# Patient Record
Sex: FEMALE | Race: WHITE | NOT HISPANIC OR LATINO | URBAN - METROPOLITAN AREA
[De-identification: names, ages, dates, MRNs, and addresses within clinical notes are randomized per-mention and may not be internally consistent; named-entity substitution may affect disease eponyms.]

---

## 2019-04-01 ENCOUNTER — COMMUNICATION - HEALTHEAST (OUTPATIENT)
Dept: SCHEDULING | Facility: CLINIC | Age: 39
End: 2019-04-01

## 2019-04-02 ENCOUNTER — COMMUNICATION - HEALTHEAST (OUTPATIENT)
Dept: TELEHEALTH | Facility: CLINIC | Age: 39
End: 2019-04-02

## 2019-04-02 ENCOUNTER — OFFICE VISIT - HEALTHEAST (OUTPATIENT)
Dept: FAMILY MEDICINE | Facility: CLINIC | Age: 39
End: 2019-04-02

## 2019-04-02 ENCOUNTER — COMMUNICATION - HEALTHEAST (OUTPATIENT)
Dept: FAMILY MEDICINE | Facility: CLINIC | Age: 39
End: 2019-04-02

## 2019-04-02 DIAGNOSIS — Z76.89 ESTABLISHING CARE WITH NEW DOCTOR, ENCOUNTER FOR: ICD-10-CM

## 2019-04-02 DIAGNOSIS — G44.82 HEADACHE ASSOCIATED WITH SEXUAL ACTIVITY: ICD-10-CM

## 2019-04-02 ASSESSMENT — MIFFLIN-ST. JEOR: SCORE: 1307.5

## 2019-04-04 ENCOUNTER — AMBULATORY - HEALTHEAST (OUTPATIENT)
Dept: ADMINISTRATIVE | Facility: CLINIC | Age: 39
End: 2019-04-04

## 2019-04-04 ENCOUNTER — COMMUNICATION - HEALTHEAST (OUTPATIENT)
Dept: FAMILY MEDICINE | Facility: CLINIC | Age: 39
End: 2019-04-04

## 2019-04-04 DIAGNOSIS — G44.82 HEADACHE ASSOCIATED WITH SEXUAL ACTIVITY: ICD-10-CM

## 2019-04-12 ENCOUNTER — OFFICE VISIT - HEALTHEAST (OUTPATIENT)
Dept: FAMILY MEDICINE | Facility: CLINIC | Age: 39
End: 2019-04-12

## 2019-04-12 DIAGNOSIS — G44.82 HEADACHE ASSOCIATED WITH SEXUAL ACTIVITY: ICD-10-CM

## 2019-04-15 ENCOUNTER — COMMUNICATION - HEALTHEAST (OUTPATIENT)
Dept: FAMILY MEDICINE | Facility: CLINIC | Age: 39
End: 2019-04-15

## 2019-04-24 ENCOUNTER — TELEPHONE (OUTPATIENT)
Facility: CLINIC | Age: 39
End: 2019-04-24

## 2019-04-24 ENCOUNTER — OFFICE VISIT (OUTPATIENT)
Dept: NEUROLOGY | Facility: CLINIC | Age: 39
End: 2019-04-24
Payer: COMMERCIAL

## 2019-04-24 ENCOUNTER — RECORDS - HEALTHEAST (OUTPATIENT)
Dept: ADMINISTRATIVE | Facility: OTHER | Age: 39
End: 2019-04-24

## 2019-04-24 VITALS
HEIGHT: 65 IN | BODY MASS INDEX: 24.44 KG/M2 | DIASTOLIC BLOOD PRESSURE: 92 MMHG | RESPIRATION RATE: 16 BRPM | SYSTOLIC BLOOD PRESSURE: 162 MMHG | HEART RATE: 71 BPM | TEMPERATURE: 98.7 F | WEIGHT: 146.7 LBS | OXYGEN SATURATION: 97 %

## 2019-04-24 DIAGNOSIS — Z78.9 VEGAN DIET: ICD-10-CM

## 2019-04-24 DIAGNOSIS — G44.84 EXERTIONAL HEADACHE: Primary | ICD-10-CM

## 2019-04-24 DIAGNOSIS — R90.89 ABNORMAL MRA, BRAIN: ICD-10-CM

## 2019-04-24 DIAGNOSIS — E55.9 VITAMIN D DEFICIENCY: ICD-10-CM

## 2019-04-24 DIAGNOSIS — M54.2 NECK PAIN: ICD-10-CM

## 2019-04-24 DIAGNOSIS — H53.8 BLURRING OF VISUAL IMAGE: ICD-10-CM

## 2019-04-24 DIAGNOSIS — G44.84 EXERTIONAL HEADACHE: ICD-10-CM

## 2019-04-24 LAB
ALBUMIN SERPL-MCNC: 3.9 G/DL (ref 3.4–5)
ALP SERPL-CCNC: 51 U/L (ref 40–150)
ALT SERPL W P-5'-P-CCNC: 23 U/L (ref 0–50)
ANION GAP SERPL CALCULATED.3IONS-SCNC: 7 MMOL/L (ref 3–14)
AST SERPL W P-5'-P-CCNC: 12 U/L (ref 0–45)
BASOPHILS # BLD AUTO: 0.1 10E9/L (ref 0–0.2)
BASOPHILS NFR BLD AUTO: 1.1 %
BILIRUB SERPL-MCNC: 0.3 MG/DL (ref 0.2–1.3)
BUN SERPL-MCNC: 12 MG/DL (ref 7–30)
CALCIUM SERPL-MCNC: 9.6 MG/DL (ref 8.5–10.1)
CHLORIDE SERPL-SCNC: 108 MMOL/L (ref 94–109)
CO2 SERPL-SCNC: 24 MMOL/L (ref 20–32)
CREAT SERPL-MCNC: 0.57 MG/DL (ref 0.52–1.04)
DIFFERENTIAL METHOD BLD: NORMAL
EOSINOPHIL # BLD AUTO: 0.7 10E9/L (ref 0–0.7)
EOSINOPHIL NFR BLD AUTO: 8.2 %
ERYTHROCYTE [DISTWIDTH] IN BLOOD BY AUTOMATED COUNT: 12.2 % (ref 10–15)
GFR SERPL CREATININE-BSD FRML MDRD: >90 ML/MIN/{1.73_M2}
GLUCOSE SERPL-MCNC: 95 MG/DL (ref 70–99)
HCT VFR BLD AUTO: 39.9 % (ref 35–47)
HGB BLD-MCNC: 13.4 G/DL (ref 11.7–15.7)
IMM GRANULOCYTES # BLD: 0 10E9/L (ref 0–0.4)
IMM GRANULOCYTES NFR BLD: 0.2 %
LYMPHOCYTES # BLD AUTO: 1.9 10E9/L (ref 0.8–5.3)
LYMPHOCYTES NFR BLD AUTO: 23.3 %
MCH RBC QN AUTO: 31.6 PG (ref 26.5–33)
MCHC RBC AUTO-ENTMCNC: 33.6 G/DL (ref 31.5–36.5)
MCV RBC AUTO: 94 FL (ref 78–100)
MONOCYTES # BLD AUTO: 0.6 10E9/L (ref 0–1.3)
MONOCYTES NFR BLD AUTO: 7.2 %
NEUTROPHILS # BLD AUTO: 5 10E9/L (ref 1.6–8.3)
NEUTROPHILS NFR BLD AUTO: 60 %
NRBC # BLD AUTO: 0 10*3/UL
NRBC BLD AUTO-RTO: 0 /100
PLATELET # BLD AUTO: 280 10E9/L (ref 150–450)
POTASSIUM SERPL-SCNC: 3.7 MMOL/L (ref 3.4–5.3)
PROT SERPL-MCNC: 7.8 G/DL (ref 6.8–8.8)
RBC # BLD AUTO: 4.24 10E12/L (ref 3.8–5.2)
SODIUM SERPL-SCNC: 139 MMOL/L (ref 133–144)
T4 FREE SERPL-MCNC: 0.96 NG/DL (ref 0.76–1.46)
TSH SERPL DL<=0.005 MIU/L-ACNC: 0.92 MU/L (ref 0.4–4)
VIT B12 SERPL-MCNC: 279 PG/ML (ref 193–986)
WBC # BLD AUTO: 8.3 10E9/L (ref 4–11)

## 2019-04-24 ASSESSMENT — MIFFLIN-ST. JEOR: SCORE: 1325.05

## 2019-04-24 ASSESSMENT — ENCOUNTER SYMPTOMS
NECK PAIN: 1
INSOMNIA: 1
PARALYSIS: 0
PANIC: 0
NECK MASS: 0
TASTE DISTURBANCE: 0
MEMORY LOSS: 0
SMELL DISTURBANCE: 0
SORE THROAT: 0
SINUS CONGESTION: 0
TREMORS: 0
HOARSE VOICE: 0
TINGLING: 0
BACK PAIN: 1
MUSCLE CRAMPS: 0
NUMBNESS: 0
HOT FLASHES: 0
STIFFNESS: 0
MYALGIAS: 1
JOINT SWELLING: 0
EYE WATERING: 0
WEAKNESS: 0
LOSS OF CONSCIOUSNESS: 0
SPEECH CHANGE: 0
ARTHRALGIAS: 0
EYE REDNESS: 0
DOUBLE VISION: 0
DIZZINESS: 0
MUSCLE WEAKNESS: 0
DEPRESSION: 1
DECREASED CONCENTRATION: 1
DECREASED LIBIDO: 0
NERVOUS/ANXIOUS: 1
HEADACHES: 1
SEIZURES: 0
DISTURBANCES IN COORDINATION: 0
EYE PAIN: 1
SINUS PAIN: 0
TROUBLE SWALLOWING: 0
EYE IRRITATION: 1

## 2019-04-24 ASSESSMENT — PAIN SCALES - GENERAL: PAINLEVEL: NO PAIN (0)

## 2019-04-24 NOTE — LETTER
"2019       RE: Diane Blackman  [address] [place] [address]     Dear Colleague,    Thank you for referring your patient, Diane Blackman, to the [facility] NEUROLOGY at [facility]. Please see a copy of my visit note below.    Service Date: 2019        RE: Diane Blackman   MRN: [id number]   : 1980      Dear Dr. Cam:      Thank you for referring Diane Blackman for neurologic consultation on 2018.  The patient is a 39-year-old woman you were kind enough to refer because of exertional headaches.      The patient said that sometime after the first of the year she developed a coital headache during climax.  She then had a severe onset of headache on .  This was right-sided, again at climax.  She said it involved the right parietal temporal area.  It was awful for 5 minutes and went into the ear and down the side of her right neck.  She said the pain lasted for at least an hour and then seemed to linger up to 4 hours and she was \"wiped out and had to rest.\"  She had 5 or 6 other episodes like this, but she noted that one occurred after she was brushing her tongue and started to gag and cough.  That headache lasted probably 45 minutes.  She had other ones that happened in April.  She said that some of these had occurred when she was doing aerobic type of exercises.  She would lift up to 6-pound dumbbells in either arm.  She can recall though having 1 episode that occurred lifting a satchel weighing 15-20 pounds.  She started to have another one during intercourse, but she said that she did not go through with the complete act and it got better.  The patient did recall that the night before the episode on , she probably had drank 5 bottles of beer over the course of a number of hours and had pizza.  She did not think there was anything unusual and she usually does not drink this much.  She did recall having normal " "sleep and she had intercourse with her boyfriend upon awakening in the morning.  She had not had coffee yet and she typically drinks a pot and a half of coffee per day.  The patient said she was nauseated with the most severe headache but did not vomit.  She has never had previous headaches with coughing, sneezing, straining or lifting including bowel movements.  She has lifted her daughter who weighs 80 pounds and also garbage.  She otherwise does not usually do heavy lifting other than the satchel she carries.  She never had stiffness for forward flexion of her neck.  She has not had headaches in the past.  She did review with me dietary issues and she has been a vegetarian since age 15.  She also does drink beside coffee Dr. Pepper or Diet Pepsi daily.  She does not need any meat, but does eat eggs and cheese.  The patient did take a multivitamin when she was pregnant with her daughter.  She did notice this since these headaches started that it is hard for her to see clearly.  She has to squint.  She said that handwriting looks smaller.  She does also note her right eye hurts after reading.  She has been using \"cheater lenses\".  She denied any weakness, paresthesias, imbalance, visual auras, speech arrest or other problems.      I was able to review your records.  Specifically, I reviewed these with her from 2019.  The patient did also tell me that she has continued to smoke, although she has been admonished not to and knows it is injurious to her health.  She has not used any illicit drugs including marijuana.  She has no history of allergies.  The patient has had a , described herself as being in good health.  She has had trouble though with fatigue.  She is .  She has a significant boyfriend that she sees.  The patient reviewed her family history and of her 3 sisters 2 have had chronic headaches and 1 has had issues that are female in nature.  Her child is alive and well.  Her father " suffered a brain hemorrhage at age 60 and started having a series of strokes after 40.  He had occasionally used alcohol and he had had chronic hypertension and passive smoke inhalation.  Her mother has been a chronic smoker and has chronic obstructive lung disease and is 59.      The patient did have MRA study done through [facility].  I could only get the report and not actual films.  She will get a CD ROM for us.  It showed a 1 mm right lateral projected outpouching off of the A2 segment suggesting a tiny laterally projecting aneurysm versus small vascular infundibulum.  It was recommended that she have thin section head CTA for further evaluation.  Her head MRV was normal.  This study was done on 04/12/2019.  The study result was reviewed with Dr. Todd and he did recommend that she go ahead and have formal CT angiography of the neck and intracranial vessels and have consult with him in the Neurology/Neurosurgery [facility]neurysm [facility].  She also on 04/04/2019 had a normal head MRI through [facility].      I reviewed your record too from 04/12/2019 with her.      Neurologic examination revealed a pleasant woman.  Her blood pressure was initially 162/92 done by our medical assistant with a blood pressure machine.  Her pulse was 71.  I retook it with a soft cuff and it was 140/78 with a pulse of 72.  Otherwise, gait, station, cerebellar testing, muscle stretch reflexes which were all normal except for mildly reduced ankle jerks, which were trace-present symmetrically, plantar stimulation, strength, cranial nerve examination, superficial and cortical sensory testing are unremarkable.  She had no cerebral or cervical bruits.  The patient's cardiac rhythm was regular without gallops or murmurs.  She had normal eyegrounds.  The patient had no signs of retinal changes or papilledema.  She did not have a Mellissa syndrome.      IMPRESSION:   1.  Exertional headaches.   2.  Abnormal intracranial MRA study.   3.   Chronic smoking history and excessive caffeine use.     4.  Fatigue.     5.  Vegetarian.   6.  Unusual visual disturbance involving the right eye since the onset of her headaches.      The patient is going to go ahead now and have a CT angiogram and followup with Dr. Todd.  She will be seeing me and also to bring her CD ROMs from [facility] to review.  I have recommended a number of blood tests to be done because of vegetarian status as well as her fatigue.  I did suggest that she followup carefully with you concerning her blood pressure elevation.  She is going to have intercourse again, but attempt taking 400-600 mg of ibuprofen 30 minutes ahead of the time of intercourse to see if this helps.  I talked with her about other medicines that could be tried and did go over the diagnosis of coital headaches with her through the Internet. I also suggested eye examination.  She will be seen back shortly.        Thank you for allowing me to see this patient.         I spent 1 hour with the patient.  Over 50% of the time this involved counseling and coordination of care. A complete review of medical systems was done and a positive review in the above report.        D: 2019   T: 2019   MT: AKA      Name:     NEAL VILLALBA   MRN:      [id number]        Account:      [id number]   :      1980           Service Date: 2019      Document: [id number]        Again, thank you for allowing me to participate in the care of your patient.      Sincerely,    Dionte García MD    CC:  [place] [address]

## 2019-04-24 NOTE — TELEPHONE ENCOUNTER
[facility] Call Center    Phone Message    May a detailed message be left on voicemail: yes    Reason for Call: Other: pt calling to see if Ct is going to include having her kidneies checked as well dacia that there was converstation about it. Please call pt back to discuss.     Action Taken: Message routed to:  [facility] ([facility] neurology

## 2019-04-24 NOTE — NURSING NOTE
Chief Complaint   Patient presents with     New Patient     [facility] NEW - POSSIBLE BRAIN ANEURYSM / SENSATION , RIGHT SIDE OF THE HEAD     Results     Rufina Carrasco

## 2019-04-25 LAB — DEPRECATED CALCIDIOL+CALCIFEROL SERPL-MC: 9 UG/L (ref 20–75)

## 2019-04-25 NOTE — TELEPHONE ENCOUNTER
Spoke with pt and let her know that all of her labs were normal. Specifically, kidney function was normal so she is able to proceed with CT.

## 2019-04-26 ENCOUNTER — TELEPHONE (OUTPATIENT)
Dept: NEUROLOGY | Facility: CLINIC | Age: 39
End: 2019-04-26

## 2019-04-26 DIAGNOSIS — E55.9 VITAMIN D DEFICIENCY: Primary | ICD-10-CM

## 2019-04-26 DIAGNOSIS — E55.9 VITAMIN D DEFICIENCY: ICD-10-CM

## 2019-04-26 LAB — METHYLMALONATE SERPL-SCNC: 0.18 UMOL/L (ref 0–0.4)

## 2019-04-26 RX ORDER — ERGOCALCIFEROL 1.25 MG/1
50000 CAPSULE, LIQUID FILLED ORAL WEEKLY
Qty: 8 CAPSULE | Refills: 0 | Status: SHIPPED | OUTPATIENT
Start: 2019-04-26 | End: 2019-06-20

## 2019-04-26 RX ORDER — ERGOCALCIFEROL 1.25 MG/1
50000 CAPSULE, LIQUID FILLED ORAL WEEKLY
Qty: 8 CAPSULE | Refills: 0 | Status: SHIPPED | OUTPATIENT
Start: 2019-04-26 | End: 2019-04-26

## 2019-04-26 NOTE — TELEPHONE ENCOUNTER
Spoke with pt to inform her of lab results and recommendations per Dr. García.       Message   Received: Today   Message Contents   Dionte García MD Armbruster, Kathleen M RN             Vitamin d only 9[quite low]Will start prescription[ordered]; also b12 under 300, start otc 2000 mcg daily B12; I'll recheck levels when I see her

## 2019-04-29 ENCOUNTER — ANCILLARY PROCEDURE (OUTPATIENT)
Dept: CT IMAGING | Facility: CLINIC | Age: 39
End: 2019-04-29
Attending: PSYCHIATRY & NEUROLOGY
Payer: COMMERCIAL

## 2019-04-29 DIAGNOSIS — M54.2 NECK PAIN: ICD-10-CM

## 2019-04-29 DIAGNOSIS — G44.84 EXERTIONAL HEADACHE: ICD-10-CM

## 2019-04-29 DIAGNOSIS — R90.89 ABNORMAL MRA, BRAIN: ICD-10-CM

## 2019-04-29 RX ORDER — IOPAMIDOL 755 MG/ML
75 INJECTION, SOLUTION INTRAVASCULAR ONCE
Status: COMPLETED | OUTPATIENT
Start: 2019-04-29 | End: 2019-04-29

## 2019-04-29 RX ADMIN — IOPAMIDOL 75 ML: 755 INJECTION, SOLUTION INTRAVASCULAR at 15:29

## 2019-04-29 NOTE — DISCHARGE INSTRUCTIONS

## 2019-04-30 NOTE — PROGRESS NOTES
"Service Date: 2019      Shira Cam MD   [address] [address]      RE: Diane Blackman   MRN: [id number]   : 1980      Dear Dr. Cam:      Thank you for referring Diane Blackman for neurologic consultation on 2018.  The patient is a 39-year-old woman you were kind enough to refer because of exertional headaches.      The patient said that sometime after the first of the year she developed a coital headache during climax.  She then had a severe onset of headache on .  This was right-sided, again at climax.  She said it involved the right parietal temporal area.  It was awful for 5 minutes and went into the ear and down the side of her right neck.  She said the pain lasted for at least an hour and then seemed to linger up to 4 hours and she was \"wiped out and had to rest.\"  She had 5 or 6 other episodes like this, but she noted that one occurred after she was brushing her tongue and started to gag and cough.  That headache lasted probably 45 minutes.  She had other ones that happened in April.  She said that some of these had occurred when she was doing aerobic type of exercises.  She would lift up to 6-pound dumbbells in either arm.  She can recall though having 1 episode that occurred lifting a satchel weighing 15-20 pounds.  She started to have another one during intercourse, but she said that she did not go through with the complete act and it got better.  The patient did recall that the night before the episode on , she probably had drank 5 bottles of beer over the course of a number of hours and had pizza.  She did not think there was anything unusual and she usually does not drink this much.  She did recall having normal sleep and she had intercourse with her boyfriend upon awakening in the morning.  She had not had coffee yet and she typically drinks a pot and a half of coffee per day.  The patient said she was nauseated " "with the most severe headache but did not vomit.  She has never had previous headaches with coughing, sneezing, straining or lifting including bowel movements.  She has lifted her daughter who weighs 80 pounds and also garbage.  She otherwise does not usually do heavy lifting other than the satchel she carries.  She never had stiffness for forward flexion of her neck.  She has not had headaches in the past.  She did review with me dietary issues and she has been a vegetarian since age 15.  She also does drink beside coffee Dr. Pepper or Diet Pepsi daily.  She does not need any meat, but does eat eggs and cheese.  The patient did take a multivitamin when she was pregnant with her daughter.  She did notice this since these headaches started that it is hard for her to see clearly.  She has to squint.  She said that handwriting looks smaller.  She does also note her right eye hurts after reading.  She has been using \"cheater lenses\".  She denied any weakness, paresthesias, imbalance, visual auras, speech arrest or other problems.      I was able to review your records.  Specifically, I reviewed these with her from 2019.  The patient did also tell me that she has continued to smoke, although she has been admonished not to and knows it is injurious to her health.  She has not used any illicit drugs including marijuana.  She has no history of allergies.  The patient has had a , described herself as being in good health.  She has had trouble though with fatigue.  She is .  She has a significant boyfriend that she sees.  The patient reviewed her family history and of her 3 sisters 2 have had chronic headaches and 1 has had issues that are female in nature.  Her child is alive and well.  Her father suffered a brain hemorrhage at age 60 and started having a series of strokes after 40.  He had occasionally used alcohol and he had had chronic hypertension and passive smoke inhalation.  Her mother has been " a chronic smoker and has chronic obstructive lung disease and is 59.      The patient did have MRA study done through [facility].  I could only get the report and not actual films.  She will get a CD ROM for us.  It showed a 1 mm right lateral projected outpouching off of the A2 segment suggesting a tiny laterally projecting aneurysm versus small vascular infundibulum.  It was recommended that she have thin section head CTA for further evaluation.  Her head MRV was normal.  This study was done on 04/12/2019.  The study result was reviewed with Dr. Todd and he did recommend that she go ahead and have formal CT angiography of the neck and intracranial vessels and have consult with him in the Neurology/Neurosurgery Aneurysm Clinic.  She also on 04/04/2019 had a normal head MRI through [facility].      I reviewed your record too from 04/12/2019 with her.      Neurologic examination revealed a pleasant woman.  Her blood pressure was initially 162/92 done by our medical assistant with a blood pressure machine.  Her pulse was 71.  I retook it with a soft cuff and it was 140/78 with a pulse of 72.  Otherwise, gait, station, cerebellar testing, muscle stretch reflexes which were all normal except for mildly reduced ankle jerks, which were trace-present symmetrically, plantar stimulation, strength, cranial nerve examination, superficial and cortical sensory testing are unremarkable.  She had no cerebral or cervical bruits.  The patient's cardiac rhythm was regular without gallops or murmurs.  She had normal eyegrounds.  The patient had no signs of retinal changes or papilledema.  She did not have a Mellissa syndrome.      IMPRESSION:   1.  Exertional headaches.   2.  Abnormal intracranial MRA study.   3.  Chronic smoking history and excessive caffeine use.     4.  Fatigue.     5.  Vegetarian.   6.  Unusual visual disturbance involving the right eye since the onset of her headaches.      The patient is going  to go ahead now and have a CT angiogram and followup with Dr. Todd.  She will be seeing me and also to bring her CD ROMs from [facility] to review.  I have recommended a number of blood tests to be done because of vegetarian status as well as her fatigue.  I did suggest that she followup carefully with you concerning her blood pressure elevation.  She is going to have intercourse again, but attempt taking 400-600 mg of ibuprofen 30 minutes ahead of the time of intercourse to see if this helps.  I talked with her about other medicines that could be tried and did go over the diagnosis of coital headaches with her through the Internet. I also suggested eye examination.  She will be seen back shortly.        Thank you for allowing me to see this patient.       Sincerely yours,       Mara García MD      I spent 1 hour with the patient.  Over 50% of the time this involved counseling and coordination of care. A complete review of medical systems was done and a positive review in the above report.        MARA GARCÍA MD             D: 2019   T: 2019   MT: AKA      Name:     NEAL VILLALBA   MRN:      [id number]        Account:      [id number]   :      1980           Service Date: 2019      Document: [id number]

## 2019-05-01 ENCOUNTER — CARE COORDINATION (OUTPATIENT)
Dept: NEUROLOGY | Facility: CLINIC | Age: 39
End: 2019-05-01

## 2019-05-01 NOTE — PROGRESS NOTES
Dionte García MD Seeb, Tracey RN             Her cta did not show aneurysm[good news]; still follow up with me and also with neurosurgery[they need to carefully go over this test and prior study.      Called and left a voice message asking for a call back so I can inform her of the information above from Dr. García.

## 2019-05-09 ENCOUNTER — OFFICE VISIT (OUTPATIENT)
Dept: NEUROLOGY | Facility: CLINIC | Age: 39
End: 2019-05-09
Payer: COMMERCIAL

## 2019-05-09 ENCOUNTER — RECORDS - HEALTHEAST (OUTPATIENT)
Dept: ADMINISTRATIVE | Facility: OTHER | Age: 39
End: 2019-05-09

## 2019-05-09 VITALS
WEIGHT: 150 LBS | HEIGHT: 65 IN | SYSTOLIC BLOOD PRESSURE: 139 MMHG | DIASTOLIC BLOOD PRESSURE: 79 MMHG | OXYGEN SATURATION: 96 % | BODY MASS INDEX: 24.99 KG/M2 | TEMPERATURE: 98 F | HEART RATE: 69 BPM

## 2019-05-09 DIAGNOSIS — E53.8 VITAMIN B12 DEFICIENCY (NON ANEMIC): Primary | ICD-10-CM

## 2019-05-09 DIAGNOSIS — E55.9 VITAMIN D DEFICIENCY: ICD-10-CM

## 2019-05-09 ASSESSMENT — ENCOUNTER SYMPTOMS
SPEECH CHANGE: 0
EYE IRRITATION: 0
WEAKNESS: 0
DOUBLE VISION: 0
PARALYSIS: 0
NERVOUS/ANXIOUS: 1
TREMORS: 0
HEADACHES: 1
NUMBNESS: 0
INSOMNIA: 1
PANIC: 0
SINUS CONGESTION: 0
NECK MASS: 0
SORE THROAT: 0
TASTE DISTURBANCE: 0
TINGLING: 0
DISTURBANCES IN COORDINATION: 0
DEPRESSION: 1
EYE WATERING: 0
SMELL DISTURBANCE: 0
TROUBLE SWALLOWING: 0
EYE PAIN: 1
DIZZINESS: 0
HOARSE VOICE: 0
SEIZURES: 0
LOSS OF CONSCIOUSNESS: 0
MEMORY LOSS: 0
DECREASED CONCENTRATION: 1
SINUS PAIN: 0
EYE REDNESS: 0

## 2019-05-09 ASSESSMENT — MIFFLIN-ST. JEOR: SCORE: 1340.08

## 2019-05-09 ASSESSMENT — PAIN SCALES - GENERAL: PAINLEVEL: NO PAIN (0)

## 2019-05-09 NOTE — LETTER
RE: Diane Blackman  [address] [place] [address]     Service Date: 2019      Shira Cam MD   [address] [address]      RE: Diane Blackman   MRN: [id number]   : 1980      Dear Dr. Cam:      This is in regard to followup on Diane Blackman.  The patient returned today with chief complaint of exertional headache as well as possible abnormal MRA of the intracranial circulation.      The patient said that she has had intercourse once since I last saw her.  She did not try ibuprofen 30-60 minutes before.  She said fortunately, though, she did not have coital headache.  She has markedly reduced her use of caffeinated coffee and also Pepsi or Coke.  She denied any current headache or neck pain.  She reviewed my history and said it was correct and I made sure she had a copy.  The patient is going to request from you referral to our neuro-ophthalmologist because she still has trouble with her right eye.  She has had some discomfort there and also some need to squint as well as decreased vision since her headache came on.         The patient did end up having a CT angiogram done here on 2019.  This was compared to the previous outside study from 2019.  This was read by Dr. Murphy.  He felt that there was no definite aneurysm or stenosis of the major intracranial arteries.  There was no evidence of intracranial hemorrhage on the noncontrast head CT.  She had some questionable atherosclerotic irregularity of the distal right middle cerebral artery vasculature.  Her neck CTA demonstrated no significant stenosis of the major cervical arteries.  The patient was to see Dr. Todd, our neurosurgical vascular expert.  He did go over all of these films with me when the patient was here and felt that there were no findings to suggest an aneurysm.  I reassured the patient about Dr. Murphy and Dr. Todd's interpretation of  her studies.  I strongly urged the patient to stop smoking again.  She understands how injurious this could be to her health.      The patient on 2019 had a normal metabolic profile.  Her vitamin B12 level was low at 279 picograms.  Her methylmalonic acid level was normal at 0.18.  She had normal TSH and free T4.  Her vitamin D level was low at 9.  Her glucose was 95.  She had normal complete blood count, including MCV of 94.  The patient had been told by my nurse to start oral B12 replacement.  I also suggested to her today that she take a multivitamin.  I suspect that the B12 level of 279 relates to her vegetarian state.  Her vitamin D level could relate to it too and she is now on 50,000 units weekly for 8 weeks.  I have talked with her about having followup vitamin D3, B12 levels done as well as parietal cell antibody.  These will be done in approximately 2 months.  She is going to have followup with me one more time to assess these levels, but also whether her headaches have recurred.  Hopefully, my suggestions will be of benefit her.  I went over the importance of taking B12 to achieve a B12 level greater than 400 picograms.  I went over deficiency states related to B12 issues and the neurologic sequelae.  She is extremely intelligent and I believe she understands all this.  I also went over the importance of vitamin D supplementation to achieve a higher level that is therapeutic.       Thank you for allowing me to see this patient.       Sincerely yours,      Dionte García MD      I spent 30 minutes with the patient today.  Over 50% of the time this involved counseling and coordination of care.       D: 2019   T: 05/10/2019   MT: AKA    Name:     NEAL VILLALBA   MRN:      [id number]        Account:      [id number]   :      1980           Service Date: 2019    Document: [id number]

## 2019-05-10 ENCOUNTER — OFFICE VISIT - HEALTHEAST (OUTPATIENT)
Dept: FAMILY MEDICINE | Facility: CLINIC | Age: 39
End: 2019-05-10

## 2019-05-10 ENCOUNTER — COMMUNICATION - HEALTHEAST (OUTPATIENT)
Dept: FAMILY MEDICINE | Facility: CLINIC | Age: 39
End: 2019-05-10

## 2019-05-10 DIAGNOSIS — J02.9 SORE THROAT: ICD-10-CM

## 2019-05-10 DIAGNOSIS — H53.9 VISION CHANGES: ICD-10-CM

## 2019-05-10 DIAGNOSIS — G44.82 HEADACHE ASSOCIATED WITH SEXUAL ACTIVITY: ICD-10-CM

## 2019-05-10 LAB — DEPRECATED S PYO AG THROAT QL EIA: NORMAL

## 2019-05-10 NOTE — PROGRESS NOTES
Service Date: 2019      Shira Cam MD   [address] [address]      RE: Diane Blackman   MRN: [id number]   : 1980      Dear Dr. Cam:      This is in regard to followup on Diane Blackman.  The patient returned today with chief complaint of exertional headache as well as possible abnormal MRA of the intracranial circulation.      The patient said that she has had intercourse once since I last saw her.  She did not try ibuprofen 30-60 minutes before.  She said fortunately, though, she did not have coital headache.  She has markedly reduced her use of caffeinated coffee and also Pepsi or Coke.  She denied any current headache or neck pain.  She reviewed my history and said it was correct and I made sure she had a copy.  The patient is going to request from you referral to our neuro-ophthalmologist because she still has trouble with her right eye.  She has had some discomfort there and also some need to squint as well as decreased vision since her headache came on.         The patient did end up having a CT angiogram done here on 2019.  This was compared to the previous outside study from 2019.  This was read by Dr. Murphy.  He felt that there was no definite aneurysm or stenosis of the major intracranial arteries.  There was no evidence of intracranial hemorrhage on the noncontrast head CT.  She had some questionable atherosclerotic irregularity of the distal right middle cerebral artery vasculature.  Her neck CTA demonstrated no significant stenosis of the major cervical arteries.  The patient was to see Dr. Todd, our neurosurgical vascular expert.  He did go over all of these films with me when the patient was here and felt that there were no findings to suggest an aneurysm.  I reassured the patient about Dr. Murphy and Dr. Todd's interpretation of her studies.  I strongly urged the patient to stop smoking again.  She  understands how injurious this could be to her health.      The patient on 2019 had a normal metabolic profile.  Her vitamin B12 level was low at 279 picograms.  Her methylmalonic acid level was normal at 0.18.  She had normal TSH and free T4.  Her vitamin D level was low at 9.  Her glucose was 95.  She had normal complete blood count, including MCV of 94.  The patient had been told by my nurse to start oral B12 replacement.  I also suggested to her today that she take a multivitamin.  I suspect that the B12 level of 279 relates to her vegetarian state.  Her vitamin D level could relate to it too and she is now on 50,000 units weekly for 8 weeks.  I have talked with her about having followup vitamin D3, B12 levels done as well as parietal cell antibody.  These will be done in approximately 2 months.  She is going to have followup with me one more time to assess these levels, but also whether her headaches have recurred.  Hopefully, my suggestions will be of benefit her.  I went over the importance of taking B12 to achieve a B12 level greater than 400 picograms.  I went over deficiency states related to B12 issues and the neurologic sequelae.  She is extremely intelligent and I believe she understands all this.  I also went over the importance of vitamin D supplementation to achieve a higher level that is therapeutic.       Thank you for allowing me to see this patient.       Sincerely yours,      Mara García MD      I spent 30 minutes with the patient today.  Over 50% of the time this involved counseling and coordination of care.         MARA GARCÍA MD             D: 2019   T: 05/10/2019   MT: AKA      Name:     NEAL VILLALBA   MRN:      [id number]        Account:      [id number]   :      1980           Service Date: 2019      Document: [id number]

## 2019-05-11 LAB — GROUP A STREP BY PCR: NORMAL

## 2019-06-20 DIAGNOSIS — E55.9 VITAMIN D DEFICIENCY: ICD-10-CM

## 2019-06-20 RX ORDER — ERGOCALCIFEROL 1.25 MG/1
50000 CAPSULE, LIQUID FILLED ORAL WEEKLY
Qty: 8 CAPSULE | Refills: 0 | Status: SHIPPED | OUTPATIENT
Start: 2019-06-20

## 2019-06-20 NOTE — TELEPHONE ENCOUNTER
Last Written Prescription Date: 4/26/19  Last Fill Quantity: 8 caps  Last office visit: 5/9/19  Future Office Visit: 2 months

## 2021-05-27 NOTE — PROGRESS NOTES
[facility] wasn't able to add mra she has been scheduled for 04/10/@[address] [facility]

## 2021-05-27 NOTE — TELEPHONE ENCOUNTER
"Triage call:     Sudden and significant Headache that started during sex, right hand side and right front of her neck, shaking and felt nauseated. Still having pain but indicates that the pain is mild and lingering at the base of her neck, ibuprofen yesterday and today she took a tension migraine headache    Triaged to be seen in the next 3 days, reviewed additional care advice with patient and she verbalizes understanding. Patient warm transferred to scheduling for appointment. Appointment scheduled 4/3/19 2:50 pm with Dr. Mo.     Felicita Ny RN [facility] Care Connection Triage/Med Refill 4/1/2019 12:13 PM    Reason for Disposition    Headache started during sex    Answer Assessment - Initial Assessment Questions  1. LOCATION: \"Where does it hurt?\"       Currently pain is at the base of her neck, was a severe pain on the right side of her head and neck   2. ONSET: \"When did the headache start?\" (Minutes, hours or days)       Noon yesterday  3. PATTERN: \"Does the pain come and go, or has it been constant since it started?\"      Pain has gotten less since yesterday, dull lingering pain that hasn't gone away   4. SEVERITY: \"How bad is the pain?\" and \"What does it keep you from doing?\"  (e.g., Scale 1-10; mild, moderate, or severe)    - MILD (1-3): doesn't interfere with normal activities     - MODERATE (4-7): interferes with normal activities or awakens from sleep     - SEVERE (8-10): excruciating pain, unable to do any normal activities         Mild pain   5. RECURRENT SYMPTOM: \"Have you ever had headaches before?\" If so, ask: \"When was the last time?\" and \"What happened that time?\"       No   6. CAUSE: \"What do you think is causing the headache?\"      Happened during sex   7. MIGRAINE: \"Have you been diagnosed with migraine headaches?\" If so, ask: \"Is this headache similar?\"       No   8. HEAD INJURY: \"Has there been any recent injury to the head?\"       No   9. OTHER SYMPTOMS: \"Do you have any other symptoms?\" " "(fever, stiff neck, eye pain, sore throat, cold symptoms)      No additional pain   10. PREGNANCY: \"Is there any chance you are pregnant?\" \"When was your last menstrual period?\"        no    Protocols used: HEADACHE-A-OH      "

## 2021-05-27 NOTE — PROGRESS NOTES
[facility] clinic EXAM note      Chief Complaint   Patient presents with     Follow-up     MRI       Assessment & Plan       Visit Diagnoses     Headache associated with sexual activity    -  Primary:Neurological exam continues to be normal.  Provided reassurance that overall MRI and MRV were normal and that MRA just had a very very mild finding of this little 1 mm outpouching.   However I do not know if this is of any significance.  Given these new headaches with orgasm I would like her to see neurology to review the imaging and see if they have any recommendations going forward.  If everything looks okay we will go ahead with trial of indomethacin and sumatriptan.  Continue with ibuprofen and Tylenol for now.  Likely two components of chronic tension headaches and these new onset headaches with orgasm and sexual activity.  She is a follow-up for that and for an annual physical after seeing neurology.  Reviewed red flag signs and symptoms of when to call or go to the emergency room.  She states understanding is agreeable to plan.    Relevant Orders    Ambulatory referral to Neurology            History    Diane Blackman is a 39 y.o.  female who presents for the following issues:    Follow-up headache with sexual activity: Patient has undergone MRI, MRA and MRV.  MRI was reassuring and negative for any masses, hemorrhage or infarct.  MRA/MRV as below.  Only finding was a very small 1 mm right laterally projecting outpouching of the A2 segment.  Unclear of aneurysm versus a vascular infundibulum.  I reviewed these results with patient today.  She has had no episodes of headache since Friday.  Previous to that she was kind of waking up and going to bed with a headache.  She states it is also the end of the semester so she is not sure if she is just stressed and is more of a tension headache.  She does have history of tension headaches it was just the new thunderclap headache with orgasm that we were concerned  about.  Tension headaches are usually in the back.  She has not had sex since the night she got the severe headache.  She has noticed a more intense headache slaps/sharpness with one episode brushing her tongue and had a gag reflex and then had more that severe pain on her right side and into her neck.  But now she is not had that happen since.  She has noticed her textbooks are little bit more difficult to read.  Not blurry but probably straining because the print is small.  This week she was having some buildup of her tension headaches usually feels it in her shoulders.  Starting to get a intensified so had to sit down contact her friend to get her mind off of it.  But it it deafly builds up more it was not a sudden headache.  And no pressure like she had with the orgasm episode.  She had been carrying her book bag on that her right side at school and she thinks that might have triggered that tension headache.  She otherwise denies any numbness or tingling.  No slurred speech.  No dysarthria.  No memory or concentration issues.    Mra Head Without Contrast    Result Date: 4/11/2019  EXAM DATE:         04/11/2019 EXAM: MRA HEAD WITHOUT CONTRAST, MRV HEAD LOCATION: [facility] DATE/TIME: 4/11/2019 7:15 AM INDICATION: Headache, acute, severe, thunderclap, worst HA of life. COMPARISON: 4/4/2019 TECHNIQUE: 1. 3D time-of-flight head MRA without intravenous contrast. 2. Head MRV without IV contrast using time-of-flight technique. FINDINGS: HEAD MRA: Bilateral distal internal carotid arteries are patent. Bilateral MCA are patent. 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 Bilateral MEHRAN are patent. Left vertebral artery dominance with patent distal vertebral arteries, basilar artery, and bilateral PCA. Along the proximal A2 segment, there is small 1 mm right laterally projecting outpouching demonstrated on axial source image 73 of series 4. This potentially reflects a tiny laterally  projecting aneurysm versus small vascular infundibulum. Thin-section head CTA can provide further evaluation as clinically warranted. Alternatively, a follow-up head MRA could be considered to demonstrate stability of this finding. No findings concerning for aneurysm elsewhere. No high-flow vascular malformation. HEAD MRV: Superficial and deep venous sinuses are patent with no findings to suggest venous sinus thrombosis. Asymmetric prominence of the left transverse sinus relative to the right is presumed physiologic in nature. Apparent filling defect in the superior left internal jugular vein presumably reflects a flow-related artifact. Remainder negative. CONCLUSION: HEAD MRA: 1.  1 mm right laterally projecting outpouching off of the A2 segment suggests a tiny laterally projecting aneurysm versus a small vascular infundibulum. Thin-section head CTA can provide further evaluation as clinically warranted. Alternatively, a follow-up head MRA could be considered to demonstrate stability of this finding. 2.  No findings concerning for an additional aneurysm elsewhere. 3.  No high-grade intracranial stenosis. 4.  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 HEAD MRV: 1.  Superficial and deep venous sinuses are patent with no findings for venous sinus thrombosis.            mEDICATIONS    No current outpatient medications on file prior to visit.     No current facility-administered medications on file prior to visit.        Pertinent past medical, surgical, social and family history reviewed and updated in [facility].    Social History     Socioeconomic History     Marital status:      Spouse name: Not on file     Number of children: Not on file     Years of education: Not on file     Highest education level: Not on file   Occupational History     Occupation: [employer]   Social Needs     Financial resource strain: Not on file     Food insecurity:     Worry: Not on file     Inability:  Not on file     Transportation needs:     Medical: Not on file     Non-medical: Not on file   Tobacco Use     Smoking status: Current Every Day Smoker     Packs/day: 0.50     Years: 20.00     Pack years: 10.00     Smokeless tobacco: Never Used   Substance and Sexual Activity     Alcohol use: Not on file     Drug use: Not on file     Sexual activity: Not on file   Lifestyle     Physical activity:     Days per week: Not on file     Minutes per session: Not on file     Stress: Not on file   Relationships     Social connections:     Talks on phone: Not on file     Gets together: Not on file     Attends Evangelical service: Not on file     Active member of club or organization: Not on file     Attends meetings of clubs or organizations: Not on file     Relationship status: Not on file     Intimate partner violence:     Fear of current or ex partner: Not on file     Emotionally abused: Not on file     Physically abused: Not on file     Forced sexual activity: Not on file   Other Topics Concern     Not on file   Social History Narrative     Not on file         Review of systems    Pertinent Positives and negatives in HPI.     Physical Exam    /76 (Patient Site: Right Arm, Patient Position: Sitting, Cuff Size: Adult Regular)   Pulse 84   Resp 22   Wt 146 lb (66.2 kg)   LMP 03/31/2019 (Exact Date)   BMI 24.80 kg/m    GEN:  39 y.o. female sitting comfortably in no apparent distress.   HEENT: EOMI, no scleral icterus, buccal mucosa moist  Neck: Supple without lymphadenopathy or thyromegally.  Some tension in her trapezius muscles bilaterally.  CHEST/LUNG: No respiratory distress  MSK:  Strength grossly normal  NEURO: Gait normal, coordination intact, cranial nerves II through XII grossly intact, reflexes normal  PSYCH:  Mood and affect appropriate      At the end of the encounter, I discussed results, diagnosis, medications. Discussed red flags for immediate return to clinic/ER, as well as indications for follow up if  no improvement. Patient and/or caregiver understood and agreed to plan. Patient was stable for discharge.      Shira Cam

## 2021-05-27 NOTE — PROGRESS NOTES
[facility] clinic EXAM note      Chief Complaint   Patient presents with     Headache     started on 3/31/19, on and off         Assessment & Plan    Problem List Items Addressed This Visit     None      Visit Diagnoses     Establishing care with new doctor, encounter for    -  Primary: hx, all and medications reviewed and updated in the chart as below.     Headache with sexual activity : concern for what sounds like a thunderclap headache at time of orgasm. Severe, intense and sudden onset. Describes as worst headache of her life. She denies any other red flag signs or symptoms at this time and she is neurologically intact so I do not think this is an emergency or appropriate for ED referral as has been >72 hrs since occurrence.  But given the intensity and new headache symptoms and association with orgasm structural abnormality and SAH need to be ruled out. She does have significant hx of smoking and do not mild range blood pressure today which would be risk factors for her. She has this continued weird pressure and numbing sensation on the right side of her head as well. Will obtain MRI, MRA and MRV and patient is agreeable to plan. F/u after results to discuss further management of headaches. Discussed there are headaches that occur during orgasm and we can discuss this in further detail at next visit. Patient is otherwise stable. Reasons to go to ED discussed in detail including neurological symptoms or severe worsening pain.     Relevant Orders    MR Brain With Without Contrast  -     MRA Head Without Contrast; Future  -     MRV Head With Without Contrast; Future          History    Diane Blackman is a 39 y.o.  female who presents for the following issues:    Establishing Care: Insurance changed and shows [facility] as her primary clinic but was unable to get appointment there today for this concern so came over here.  Not sure which clinic she will end up going to.  States she is it has been a long  "time since she is seeing physician.  Here really today because of the concern about the headache.  No significant chronic issues, she is not on any medications.  Just history of .  She is an every day smoker.    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   Has hx of \"migraines\" but per description below sound more tension related.   This was very different   Getting ready to climax with partner and severe right sided pain, came down the neck. Very intense for a moment. 10/10. Worst headache of her life. Debilitating.   Would describe as thunderclap when asked.   Calmed down after a few minutes.   laid in bed and held her head, that's all she could do.   Took two ibuprofen. Within an hour. Felt better yet.   This morning had it again. -8/10  Now just has this senation.   A little but of a numbing, pressure ache central    Some pain behind the eye ball     But now she has this on going \"sensation\" on the right side of her head and to her heck.   Next day -(Used to have eating disorder strong gag reflex.) - gagged while brushing teeth,  Coughed,  And pain came back. Sharp and intense for a moment.   Anxiety was going up- telling her supervisor. Pain and intensity    Hx of HA:  Usually takes ibuprofen - tension related. Has never been seen for them  masage therapy and tension migraine over the counter  Linger for  a couple of days.   No aura  felt nauseous a couple times.   More eye sensory . Eyes hurt behind but no vision stuff.   Sounds and lights bother her.   Have the big lights off at work   Usually needs to lay in her bed with out sound or light and low grade fan noise.      had the weird sensation all day.  Not really a  numbness or tingling.   Cut 1/4 could feel where it began and where it ended inside her head.        mEDICATIONS    No current outpatient medications on file prior to visit.     No current facility-administered medications on file prior to visit.  "       Pertinent past medical, surgical, social and family history reviewed and updated in Epic.    Social History     Socioeconomic History     Marital status:      Spouse name: Not on file     Number of children: Not on file     Years of education: Not on file     Highest education level: Not on file   Occupational History     Occupation: [employer]   Social Needs     Financial resource strain: Not on file     Food insecurity:     Worry: Not on file     Inability: Not on file     Transportation needs:     Medical: Not on file     Non-medical: Not on file   Tobacco Use     Smoking status: Current Every Day Smoker     Packs/day: 0.50     Years: 20.00     Pack years: 10.00     Smokeless tobacco: Never Used   Substance and Sexual Activity     Alcohol use: Not on file     Drug use: Not on file     Sexual activity: Not on file   Lifestyle     Physical activity:     Days per week: Not on file     Minutes per session: Not on file     Stress: Not on file   Relationships     Social connections:     Talks on phone: Not on file     Gets together: Not on file     Attends Quaker service: Not on file     Active member of club or organization: Not on file     Attends meetings of clubs or organizations: Not on file     Relationship status: Not on file     Intimate partner violence:     Fear of current or ex partner: Not on file     Emotionally abused: Not on file     Physically abused: Not on file     Forced sexual activity: Not on file   Other Topics Concern     Not on file   Social History Narrative     Not on file     Past Surgical History:   Procedure Laterality Date      SECTION  2008     Past Medical History:   Diagnosis Date     Abnormal Pap smear of cervix      Eating disorder     college     STD (female)      Family History   Problem Relation Age of Onset     COPD Mother      Stroke Father      Hypertension Sister      Ovarian cysts Sister      Depression Sister      Breast cancer  "Niece      Lung cancer Maternal Aunt            Review of systems    ROS: 14 pt review of systems preformed and all negative except noted in HPI and sleeping difficulties, eye pain, backache, anxiety, excessive menstrual bleeding as indicated on the intake form    Physical Exam    /86 (Patient Site: Left Arm, Patient Position: Sitting, Cuff Size: Adult Regular)   Pulse 72   Resp 20   Ht 5' 4.33\" (1.634 m)   Wt 146 lb (66.2 kg)   LMP 03/31/2019 (Exact Date)   BMI 24.80 kg/m    GEN:  39 y.o. female sitting comfortably in no apparent distress.   HEENT: EOMI, no scleral icterus, buccal mucosa moist  Neck: Supple without lymphadenopathy   CHEST/LUNG: No respiratory distress, good air flow to bases, CTAB   CV: RRR, S1, S2 normal; no murmurs, rubs or gallops. No edema.  SKIN: warm, dry, no rashes or lesions  NEURO: Gait normal, coordination intact, CN II-XII grossly intact, patellar, achilles and brachial reflexes +2/4 b/l, strength in UE and LE 5/5 b/l. Sensation intact b/l.   PSYCH:  Mood and affect appropriate        Shira Cam      "

## 2021-05-28 NOTE — PROGRESS NOTES
Few days sore throat.  Pain on swallowing  No fever or rash or cough or stool urine issues  No known exp    Need referral to be covered for neuro referral suggested by the first neuro referred by Dr Cam for headache related to sexual activity         OBJECTIVE:   Vitals:    05/10/19 1016   BP: 130/78   Pulse: 72   Resp: 20   Temp: 98  F (36.7  C)      Eyes: non icteric, noninflamed  Lungs: no resp distress  Heart: regular  Ankles: no edema  Muscles: nontender  Mental status: euthymic  Neuro: nonfocal    Body mass index is 25.14 kg/m .   Nl throat  Neg rapid  Initial rapid neg  ASSESSMENT/PLAN:    1. Sore throat  Rapid Strep A Screen- Throat Swab    Group A Strep, RNA Direct Detection, Throat    penicillin VK (PEN VK) 500 MG tablet   2. Headache associated with sexual activity  Ambulatory referral to Neurology     Assisted in neuro referral  Sx rx   pvk sent for if + second test  Anticipate resolution otherwise return.  Return sooner if symptoms worsen.  More than 10 of fifteen total minutes time spent education counseling regarding the issues and care of same as listed in the assessment and plan of this note

## 2021-06-02 VITALS — WEIGHT: 146 LBS | BODY MASS INDEX: 24.8 KG/M2

## 2021-06-02 VITALS — HEIGHT: 64 IN | BODY MASS INDEX: 24.92 KG/M2 | WEIGHT: 146 LBS

## 2021-06-03 VITALS — WEIGHT: 148 LBS | BODY MASS INDEX: 24.93 KG/M2

## 2021-06-19 NOTE — LETTER
Letter by Shira Cam DO at      Author: Shira Cam DO Service: -- Author Type: --    Filed:  Encounter Date: 4/2/2019 Status: (Other)         April 2, 2019     Patient: Diane Blackman   YOB: 1980   Date of Visit: 4/2/2019       To Whom it May Concern:    Diane Blackman was seen in my clinic on 4/2/2019. Please allow an extension on her paper until 4/8/19.     If you have any questions or concerns, please don't hesitate to call.    Sincerely,         Electronically signed by Shira Cam DO

## 2021-06-19 NOTE — LETTER
Letter by Shira Cam DO at      Author: Shira Cam DO Service: -- Author Type: --    Filed:  Encounter Date: 4/2/2019 Status: (Other)         April 3, 2019     Patient: Diane Blackman   YOB: 1980   Date of Visit: 4/2/2019       To Whom it May Concern:    Diane Blackman was seen in my clinic on 4/2/2019.     If you have any questions or concerns, please don't hesitate to call.    Sincerely,         Electronically signed by Shira Cam DO

## 2021-07-03 NOTE — ADDENDUM NOTE
Addendum Note by Shira Cam DO at 4/2/2019 11:00 AM     Author: Shira Cam DO Service: -- Author Type: Physician    Filed: 4/4/2019 10:38 AM Encounter Date: 4/2/2019 Status: Signed    : Shira Cam DO (Physician)    Addended by: SHIRA CAM on: 4/4/2019 10:38 AM        Modules accepted: Orders, Level of Service

## 2021-07-03 NOTE — ADDENDUM NOTE
Addendum Note by Edyta Holly MD at 5/10/2019 10:20 AM     Author: Edyta Holly MD Service: -- Author Type: Physician    Filed: 5/10/2019  1:20 PM Encounter Date: 5/10/2019 Status: Signed    : Edyta Holly MD (Physician)    Addended by: EDYTA HOLLY on: 5/10/2019 01:20 PM        Modules accepted: Orders

## 2021-08-08 ENCOUNTER — HEALTH MAINTENANCE LETTER (OUTPATIENT)
Age: 41
End: 2021-08-08

## 2021-10-03 ENCOUNTER — HEALTH MAINTENANCE LETTER (OUTPATIENT)
Age: 41
End: 2021-10-03

## 2022-09-10 ENCOUNTER — HEALTH MAINTENANCE LETTER (OUTPATIENT)
Age: 42
End: 2022-09-10

## 2023-04-30 ENCOUNTER — HEALTH MAINTENANCE LETTER (OUTPATIENT)
Age: 43
End: 2023-04-30

## 2023-10-01 ENCOUNTER — HEALTH MAINTENANCE LETTER (OUTPATIENT)
Age: 43
End: 2023-10-01